# Patient Record
Sex: FEMALE | Race: WHITE
[De-identification: names, ages, dates, MRNs, and addresses within clinical notes are randomized per-mention and may not be internally consistent; named-entity substitution may affect disease eponyms.]

---

## 2020-02-15 ENCOUNTER — HOSPITAL ENCOUNTER (INPATIENT)
Dept: HOSPITAL 35 - ER | Age: 80
LOS: 3 days | Discharge: HOME | DRG: 536 | End: 2020-02-18
Attending: HOSPITALIST | Admitting: HOSPITALIST
Payer: COMMERCIAL

## 2020-02-15 VITALS — DIASTOLIC BLOOD PRESSURE: 50 MMHG | SYSTOLIC BLOOD PRESSURE: 154 MMHG

## 2020-02-15 VITALS — DIASTOLIC BLOOD PRESSURE: 81 MMHG | SYSTOLIC BLOOD PRESSURE: 155 MMHG

## 2020-02-15 VITALS — DIASTOLIC BLOOD PRESSURE: 88 MMHG | SYSTOLIC BLOOD PRESSURE: 17 MMHG

## 2020-02-15 VITALS — HEIGHT: 65.98 IN | WEIGHT: 124 LBS | BODY MASS INDEX: 19.93 KG/M2

## 2020-02-15 VITALS — SYSTOLIC BLOOD PRESSURE: 158 MMHG | DIASTOLIC BLOOD PRESSURE: 86 MMHG

## 2020-02-15 DIAGNOSIS — Z96.653: ICD-10-CM
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
## 2020-02-15 DIAGNOSIS — Z79.899: ICD-10-CM

## 2020-02-15 DIAGNOSIS — G89.4: ICD-10-CM

## 2020-02-15 DIAGNOSIS — S72.111A: Primary | ICD-10-CM

## 2020-02-15 DIAGNOSIS — Z88.0: ICD-10-CM

## 2020-02-15 DIAGNOSIS — Z88.1: ICD-10-CM

## 2020-02-15 DIAGNOSIS — Z87.11: ICD-10-CM
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
## 2020-02-15 DIAGNOSIS — Z90.710: ICD-10-CM

## 2020-02-15 DIAGNOSIS — R53.81: ICD-10-CM
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
## 2020-02-15 DIAGNOSIS — I10: ICD-10-CM

## 2020-02-15 DIAGNOSIS — Z98.42: ICD-10-CM
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
## 2020-02-15 LAB
ANION GAP SERPL CALC-SCNC: 5 MMOL/L (ref 7–16)
BASOPHILS NFR BLD AUTO: 0.2 % (ref 0–2)
BUN SERPL-MCNC: 10 MG/DL (ref 7–18)
CALCIUM SERPL-MCNC: 9.1 MG/DL (ref 8.5–10.1)
CHLORIDE SERPL-SCNC: 103 MMOL/L (ref 98–107)
CO2 SERPL-SCNC: 32 MMOL/L (ref 21–32)
CREAT SERPL-MCNC: 0.8 MG/DL (ref 0.6–1)
EOSINOPHIL NFR BLD: 0.4 % (ref 0–3)
ERYTHROCYTE [DISTWIDTH] IN BLOOD BY AUTOMATED COUNT: 12.8 % (ref 10.5–14.5)
GLUCOSE SERPL-MCNC: 105 MG/DL (ref 74–106)
GRANULOCYTES NFR BLD MANUAL: 82.9 % (ref 36–66)
HCT VFR BLD CALC: 38.6 % (ref 37–47)
HGB BLD-MCNC: 12.6 GM/DL (ref 12–15)
INR PPP: 1.1
LYMPHOCYTES NFR BLD AUTO: 10.4 % (ref 24–44)
MCH RBC QN AUTO: 31.2 PG (ref 26–34)
MCHC RBC AUTO-ENTMCNC: 32.5 G/DL (ref 28–37)
MCV RBC: 95.9 FL (ref 80–100)
MONOCYTES NFR BLD: 6.1 % (ref 1–8)
NEUTROPHILS # BLD: 10.5 THOU/UL (ref 1.4–8.2)
PLATELET # BLD: 257 THOU/UL (ref 150–400)
POTASSIUM SERPL-SCNC: 3.5 MMOL/L (ref 3.5–5.1)
PROTHROMBIN TIME: 10.9 SECONDS (ref 9.3–11.4)
RBC # BLD AUTO: 4.03 MIL/UL (ref 4.2–5)
SODIUM SERPL-SCNC: 140 MMOL/L (ref 136–145)
WBC # BLD AUTO: 12.6 THOU/UL (ref 4–11)

## 2020-02-15 PROCEDURE — 10195: CPT

## 2020-02-15 PROCEDURE — 10091: CPT

## 2020-02-16 VITALS — SYSTOLIC BLOOD PRESSURE: 152 MMHG | DIASTOLIC BLOOD PRESSURE: 75 MMHG

## 2020-02-16 VITALS — SYSTOLIC BLOOD PRESSURE: 121 MMHG | DIASTOLIC BLOOD PRESSURE: 54 MMHG

## 2020-02-16 VITALS — SYSTOLIC BLOOD PRESSURE: 114 MMHG | DIASTOLIC BLOOD PRESSURE: 56 MMHG

## 2020-02-16 VITALS — SYSTOLIC BLOOD PRESSURE: 133 MMHG | DIASTOLIC BLOOD PRESSURE: 60 MMHG

## 2020-02-16 LAB
ANION GAP SERPL CALC-SCNC: 11 MMOL/L (ref 7–16)
BUN SERPL-MCNC: 10 MG/DL (ref 7–18)
CALCIUM SERPL-MCNC: 9.3 MG/DL (ref 8.5–10.1)
CHLORIDE SERPL-SCNC: 99 MMOL/L (ref 98–107)
CO2 SERPL-SCNC: 27 MMOL/L (ref 21–32)
CREAT SERPL-MCNC: 0.9 MG/DL (ref 0.6–1)
ERYTHROCYTE [DISTWIDTH] IN BLOOD BY AUTOMATED COUNT: 12.6 % (ref 10.5–14.5)
GLUCOSE SERPL-MCNC: 96 MG/DL (ref 74–106)
HCT VFR BLD CALC: 36.4 % (ref 37–47)
HGB BLD-MCNC: 12 GM/DL (ref 12–15)
MCH RBC QN AUTO: 31.7 PG (ref 26–34)
MCHC RBC AUTO-ENTMCNC: 33 G/DL (ref 28–37)
MCV RBC: 96.3 FL (ref 80–100)
PLATELET # BLD: 220 THOU/UL (ref 150–400)
POTASSIUM SERPL-SCNC: 4 MMOL/L (ref 3.5–5.1)
RBC # BLD AUTO: 3.78 MIL/UL (ref 4.2–5)
SODIUM SERPL-SCNC: 137 MMOL/L (ref 136–145)
WBC # BLD AUTO: 10.8 THOU/UL (ref 4–11)

## 2020-02-16 NOTE — EKG
[facility]
[place]   [address]                     ELECTROCARDIOGRAM REPORT      
_______________________________________________________________________________
 
Name:       MEIRSHAMA MAKI               Room #:         437-P       ADM IN  
M.R.#:      [id number]                       Account #:      [id number]  
Admission:  02/15/20    Attend Phys:    Hugo Lui MD      
Discharge:              Date of Birth:  [birth year]  
                                                          Report #: [id number]
                                                                    [id number]
_______________________________________________________________________________
THIS REPORT FOR:  
 
cc:  Rob Love MD, Charles W. MD Couchonnal,Rogerio BOLIVAR MD                                            ~
THIS REPORT FOR:   //name//                          
 
                         [facility] ED
                                       
Test Date:    2020-02-15               Test Time:    19:25:46
Pat Name:     SHAMA WORLEY            Department:   
Patient ID:   [id number]            Room:         [facility]
Gender:       F                        Technician:   MAXI
:          1940               Requested By: Klarissa Sawyer
Order Number: [id number] MD:   Rogerio Robledo
                                 Measurements
Intervals                              Axis          
Rate:         84                       P:            64
FL:           168                      QRS:          68
QRSD:         117                      T:            23
QT:           397                                    
QTc:          470                                    
                           Interpretive Statements
Sinus rhythm
Sinus pause
Incomplete right bundle branch block
Compared to ECG 2008 06:48:15
 
Electronically Signed On 2020 8:52:33 CST by Rogerio Robledo
[email or web address]
 
 
 
 
 
 
 
 
 
 
 
 
 
 
  <ELECTRONICALLY SIGNED>
   By: Rogerio Robledo MD        
  20     0852
D: 02/15/20 1925                           _____________________________________
T: 02/15/20 1925                           Rogerio Robledo MD          /EPI

## 2020-02-17 VITALS — DIASTOLIC BLOOD PRESSURE: 67 MMHG | SYSTOLIC BLOOD PRESSURE: 126 MMHG

## 2020-02-17 VITALS — SYSTOLIC BLOOD PRESSURE: 114 MMHG | DIASTOLIC BLOOD PRESSURE: 61 MMHG

## 2020-02-17 VITALS — DIASTOLIC BLOOD PRESSURE: 50 MMHG | SYSTOLIC BLOOD PRESSURE: 113 MMHG

## 2020-02-17 VITALS — SYSTOLIC BLOOD PRESSURE: 114 MMHG | DIASTOLIC BLOOD PRESSURE: 48 MMHG

## 2020-02-18 VITALS — SYSTOLIC BLOOD PRESSURE: 119 MMHG | DIASTOLIC BLOOD PRESSURE: 65 MMHG

## 2020-02-18 VITALS — DIASTOLIC BLOOD PRESSURE: 65 MMHG | SYSTOLIC BLOOD PRESSURE: 119 MMHG
